# Patient Record
(demographics unavailable — no encounter records)

---

## 2024-10-21 NOTE — DATA REVIEWED
[de-identified] : C-spine wo on 9/10/24 @ [facility] EXAM:  MRI CERVICAL SPINE WITHOUT CONTRAST  HISTORY:  Bilateral upper extremity paresthesias for 6 months.  TECHNIQUE:  Multiplanar, multi-sequential MRI of the cervical spine was obtained on a 3T scanner using a standard protocol.  COMPARISON:  None.  FINDINGS:   OSSEOUS STRUCTURES: Vertebral body heights are preserved. No marrow edema or destructive marrow infiltrative process.  ALIGNMENT: Nonspecific straightening normal cervical lordosis. No significant scoliosis. No spondylolisthesis.  SPINAL CORD: Linear signal hyperintensities seen in the C2-3 through C7 spinal cord on sagittal imaging not confirmed on axial imaging. Probably artifactual versus minimal prominence of the central canal. Remaining cervical and included upper thoracic spinal cord are unremarkable. No intraspinal masses.  POSTERIOR FOSSA/CERVICOMEDULLARY JUNCTION: Normal.  NECK/PARASPINAL SOFT TISSUES: Unremarkable.  INCLUDED THORACIC SPINE: T1-2: Partially included, very small central disc herniation with mild thecal sac flattening. No spinal cord compression or spinal stenosis..  DISCS: Unremarkable.  The following axial levels are imaged and detailed below: C1-2: Unremarkable.  C2-C3: Uncovertebral spurring and facet arthropathy with mild right-sided foraminal narrowing. No disc herniations, spinal stenosis or left-sided foraminal narrowing.  C3-C4: Uncovertebral spurring and facet arthropathy, moderate to severe left and moderate right-sided foraminal narrowing. No disc herniation or spinal stenosis.  C4-C5: Uncovertebral spurring and facet arthropathy, moderate left and mild/moderate right-sided foraminal narrowing. No disc herniation or spinal stenosis.  C5-C6: Mild disc bulge, uncovertebral spurring with moderate to severe left and mild right-sided foraminal narrowing. No spinal stenosis.  C6-C7: Disc bulging with mild thecal sac flattening. Uncovertebral spurring, moderate severe left and moderate right-sided foraminal narrowing.  C7-T1: Mild disc bulge. No spinal stenosis or significant foraminal narrowing.  IMPRESSION: 1. Multilevel uncovertebral spurring and facet arthropathy with multilevel left greater than right-sided foraminal narrowing as above. 2. Partially included, very small central T1 S2 disc herniation. 3. No spinal stenosis. 4. Nonspecific straightening.  Thank you for the opportunity to participate in the care of this patient.     ONEL SHEPPARD MD  - Electronically Signed: 09- 3:56 PM  Physician to Physician Direct Line is: [phone]

## 2024-10-21 NOTE — DATA REVIEWED
[de-identified] : C-spine wo on 9/10/24 @ [facility] EXAM:  MRI CERVICAL SPINE WITHOUT CONTRAST  HISTORY:  Bilateral upper extremity paresthesias for 6 months.  TECHNIQUE:  Multiplanar, multi-sequential MRI of the cervical spine was obtained on a 3T scanner using a standard protocol.  COMPARISON:  None.  FINDINGS:   OSSEOUS STRUCTURES: Vertebral body heights are preserved. No marrow edema or destructive marrow infiltrative process.  ALIGNMENT: Nonspecific straightening normal cervical lordosis. No significant scoliosis. No spondylolisthesis.  SPINAL CORD: Linear signal hyperintensities seen in the C2-3 through C7 spinal cord on sagittal imaging not confirmed on axial imaging. Probably artifactual versus minimal prominence of the central canal. Remaining cervical and included upper thoracic spinal cord are unremarkable. No intraspinal masses.  POSTERIOR FOSSA/CERVICOMEDULLARY JUNCTION: Normal.  NECK/PARASPINAL SOFT TISSUES: Unremarkable.  INCLUDED THORACIC SPINE: T1-2: Partially included, very small central disc herniation with mild thecal sac flattening. No spinal cord compression or spinal stenosis..  DISCS: Unremarkable.  The following axial levels are imaged and detailed below: C1-2: Unremarkable.  C2-C3: Uncovertebral spurring and facet arthropathy with mild right-sided foraminal narrowing. No disc herniations, spinal stenosis or left-sided foraminal narrowing.  C3-C4: Uncovertebral spurring and facet arthropathy, moderate to severe left and moderate right-sided foraminal narrowing. No disc herniation or spinal stenosis.  C4-C5: Uncovertebral spurring and facet arthropathy, moderate left and mild/moderate right-sided foraminal narrowing. No disc herniation or spinal stenosis.  C5-C6: Mild disc bulge, uncovertebral spurring with moderate to severe left and mild right-sided foraminal narrowing. No spinal stenosis.  C6-C7: Disc bulging with mild thecal sac flattening. Uncovertebral spurring, moderate severe left and moderate right-sided foraminal narrowing.  C7-T1: Mild disc bulge. No spinal stenosis or significant foraminal narrowing.  IMPRESSION: 1. Multilevel uncovertebral spurring and facet arthropathy with multilevel left greater than right-sided foraminal narrowing as above. 2. Partially included, very small central T1 S2 disc herniation. 3. No spinal stenosis. 4. Nonspecific straightening.  Thank you for the opportunity to participate in the care of this patient.     ONEL SHEPPARD MD  - Electronically Signed: 09- 3:56 PM  Physician to Physician Direct Line is: [phone]

## 2024-10-21 NOTE — HISTORY OF PRESENT ILLNESS
[FreeTextEntry1] : LEFT arm/hand numbness for one year [de-identified] : 43 yo ambidextrous M with no significant PMH reports progressively worsening LEFT arm/hand numbness for over one year ago initially started without injury. He describes intermittent L arm/hand numbness during sleeping at night and alleviated by activity/day. He denies pain/weakness on upper extremities, balance problem, BB dysfunction. He has not been tried any conservative management.  MRI C-spine was completed by PCP which showed moderate foraminal stenosis and referred to neuro sx for further evaluation

## 2024-10-21 NOTE — PHYSICAL EXAM
[General Appearance - Alert] : alert [General Appearance - In No Acute Distress] : in no acute distress [General Appearance - Well Nourished] : well nourished [] : normal voice and communication [Oriented To Time, Place, And Person] : oriented to person, place, and time [Impaired Insight] : insight and judgment were intact [Affect] : the affect was normal [Memory Recent] : recent memory was not impaired [Motor Tone] : muscle tone was normal in all four extremities [Motor Strength] : muscle strength was normal in all four extremities [2+] : Brachioradialis left 2+ [Normal] : normal [Campbell] : Campbell's sign was not demonstrated

## 2024-10-21 NOTE — ASSESSMENT
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 severe L side foraminal stenosis @ C5-7. Neuro exam is unremarkable today.  PLAN - EMG upper extremities - PT  - f/u after EMG to review  I, Dr. Wolfgang Pathak, personally performed the evaluation and management (E/M) services for this new patient. That E/M includes conducting the clinically appropriate initial history &/or exam, assessing all conditions, and establishing the plan of care. Today, my TERRANCE, Hyunchu Starr-Gold, was here to observe my evaluation and management service for this patient & follow plan of care established by me going forward.

## 2024-10-21 NOTE — HISTORY OF PRESENT ILLNESS
[FreeTextEntry1] : LEFT arm/hand numbness for one year [de-identified] : 41 yo ambidextrous M with no significant PMH reports progressively worsening LEFT arm/hand numbness for over one year ago initially started without injury. He describes intermittent L arm/hand numbness during sleeping at night and alleviated by activity/day. He denies pain/weakness on upper extremities, balance problem, BB dysfunction. He has not been tried any conservative management.  MRI C-spine was completed by PCP which showed moderate foraminal stenosis and referred to neuro sx for further evaluation